# Patient Record
(demographics unavailable — no encounter records)

---
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
## 2024-10-11 NOTE — PHYSICAL EXAM
[Well Developed] : well developed [Well Nourished] : well nourished [No Acute Distress] : no acute distress [Normal Conjunctiva] : normal conjunctiva [Normal Venous Pressure] : normal venous pressure [Normal S1, S2] : normal S1, S2 [Murmur] : murmur [Clear Lung Fields] : clear lung fields [Good Air Entry] : good air entry [No Respiratory Distress] : no respiratory distress  [Wheeze ____] : wheeze [unfilled] [Soft] : abdomen soft [Non Tender] : non-tender [Abnormal Gait] : abnormal gait [No Edema] : no edema [No Cyanosis] : no cyanosis [No Rash] : no rash [No Skin Lesions] : no skin lesions [Moves all extremities] : moves all extremities [No Focal Deficits] : no focal deficits [Normal Speech] : normal speech [Alert and Oriented] : alert and oriented [Normal memory] : normal memory [de-identified] : uses cane, pt has hx of arhritis and chronic neck and back pain due to past injury

## 2024-10-11 NOTE — ASSESSMENT
[FreeTextEntry1] : 60-year-old female PMHx: HTN, HLD. cardiomyopathy, nonrheumatic mitral valve regurgitation, obesity, IRIS uses CPAP, prediabetes, asthma, arthritis  Pulmonary: MD Antwon Gunter   #HTN- uncontrolled #HLD- at goal  # Moderate Mitral Regurgitation in 2018  #Obesity  Patient is following up after 5 years. No chest pain, pt has SOB follows with pulmonary. Pt has hx of mild-moderate MR. Pt has past CAC of 0 in 2018. LDL is at goal at this time. BP slightly elevated in office, will reassess. Pt uses CPAP nightly. The patient may need surgery on her cervical and lumbar spine .    Plan: 2D Echo for MR  CT Calcium Score Repeat fasting bloodwork Follow up in 3-4 months

## 2024-10-11 NOTE — CARDIOLOGY SUMMARY
[de-identified] : 10-: SR 84bpm, low QRS voltage, NS T-wave abnormality [de-identified] : 6-: NST No evidence of ischemia  [de-identified] : 11-8-2018: LVEF 64%, mild-moderate MR, mild TR  [de-identified] : CT 8-2-2018: CAC 0

## 2024-10-11 NOTE — REVIEW OF SYSTEMS
[SOB] : shortness of breath [Dyspnea on exertion] : dyspnea during exertion [Lower Ext Edema] : lower extremity edema [Leg Claudication] : intermittent leg claudication [Wheezing] : wheezing [Joint Pain] : joint pain [Joint Stiffness] : joint stiffness [Negative] : Heme/Lymph [Chest Discomfort] : no chest discomfort [Palpitations] : no palpitations [Syncope] : no syncope

## 2025-01-24 NOTE — HISTORY OF PRESENT ILLNESS
[TextBox_4] : IRIS COMPLIANT AND BENEFITING MACHINE OLD SOB ON EXERTION NON  SMOKER, GAINED WEIGHT ASTHMA AS NEEDED INHALERS INCREASE USAGE

## 2025-01-24 NOTE — DISCUSSION/SUMMARY
[FreeTextEntry1] : IRIS COMPLAINT AND BENEFITING WILL ORDER NEW MACHINE ( OLD) CARDIO NOTE REVIEWED WEIGHT LOSS ASTHMA AS NEEDED INHALERS POORLY CONTROLLED START MAINTEANCE

## 2025-02-25 NOTE — PHYSICAL EXAM
[Well Developed] : well developed [Well Nourished] : well nourished [No Acute Distress] : no acute distress [Normal Conjunctiva] : normal conjunctiva [Normal Venous Pressure] : normal venous pressure [Normal S1, S2] : normal S1, S2 [Murmur] : murmur [Clear Lung Fields] : clear lung fields [Good Air Entry] : good air entry [No Respiratory Distress] : no respiratory distress  [Wheeze ____] : wheeze [unfilled] [Soft] : abdomen soft [Non Tender] : non-tender [Abnormal Gait] : abnormal gait [No Edema] : no edema [No Cyanosis] : no cyanosis [No Rash] : no rash [No Skin Lesions] : no skin lesions [Moves all extremities] : moves all extremities [No Focal Deficits] : no focal deficits [Normal Speech] : normal speech [Alert and Oriented] : alert and oriented [Normal memory] : normal memory [de-identified] : uses cane, pt has hx of arhritis and chronic neck and back pain due to past injury

## 2025-02-25 NOTE — CARDIOLOGY SUMMARY
[de-identified] : 10-: SR 84bpm, low QRS voltage, NS T-wave abnormality 2- NSR Normal ECG  [de-identified] : 6-: NST No evidence of ischemia  [de-identified] : 11-8-2018: LVEF 64%, mild-moderate MR, mild TR  11-5-2025 EF 53% Trace MR trace TR RVSP was 25 mmhg  [de-identified] : CT 8-2-2018: CAC 0
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
## 2025-02-25 NOTE — ASSESSMENT
[FreeTextEntry1] : Patient is following up after 5 years. No chest pain, pt has SOB follows with pulmonary. Pt has hx of mild-moderate MR. Pt has past CAC of 0 in 2018. LDL is at goal at this time. BP slightly elevated in office, will reassess. Pt uses CPAP nightly. The patient may need surgery on her cervical and lumbar spine .   Echo showed low normal LV systolic function . MR was trace however .   Plan:  Lexiscan stress test , SOB and will need this prior to back surgery .  Blood work  follow up in 4-5 months if nuclear

## 2025-07-18 NOTE — DISCUSSION/SUMMARY
[FreeTextEntry1] : IRIS COMPLAINT AND BENEFITING  CARDIO NOTE REVIEWED WEIGHT LOSS ASTHMA ON SYMBICORT WEILL RENEW

## 2025-07-29 NOTE — CARDIOLOGY SUMMARY
[de-identified] : 10-: SR 84bpm, low QRS voltage, NS T-wave abnormality 2- NSR Normal ECG  7- NSR NS T wave change  [de-identified] : 6-: NST No evidence of ischemia  [de-identified] : 11-8-2018: LVEF 64%, mild-moderate MR, mild TR  11-5-2024 EF 53% Trace MR trace TR RVSP was 25 mmhg  [de-identified] : CT 8-2-2018: CAC 0

## 2025-07-29 NOTE — ASSESSMENT
[FreeTextEntry1] : The patient has not had chest pain. She had some SOB which is improved .She is using her CPAP machine. Seeing Dr. Rueda as well and is taking inhalers for her asthma . HEr EF was low normal but was lower than previous echo . Need ischemia work up . She did not have lexiscan stress test as of yet   Plan:  Lexiscan stress test , SOB and will need this prior to back surgery . 2nd request  Blood work  follow up in 4-5 months if nuclear is ok

## 2025-07-29 NOTE — PHYSICAL EXAM
[Well Developed] : well developed [Well Nourished] : well nourished [No Acute Distress] : no acute distress [Normal Conjunctiva] : normal conjunctiva [Normal Venous Pressure] : normal venous pressure [Normal S1, S2] : normal S1, S2 [Murmur] : murmur [Clear Lung Fields] : clear lung fields [Good Air Entry] : good air entry [No Respiratory Distress] : no respiratory distress  [Wheeze ____] : wheeze [unfilled] [Soft] : abdomen soft [Non Tender] : non-tender [Abnormal Gait] : abnormal gait [No Edema] : no edema [No Cyanosis] : no cyanosis [No Rash] : no rash [No Skin Lesions] : no skin lesions [Moves all extremities] : moves all extremities [No Focal Deficits] : no focal deficits [Normal Speech] : normal speech [Alert and Oriented] : alert and oriented [Normal memory] : normal memory [de-identified] : uses cane, pt has hx of arhritis and chronic neck and back pain due to past injury

## 2025-07-29 NOTE — REVIEW OF SYSTEMS
[SOB] : shortness of breath [Dyspnea on exertion] : dyspnea during exertion [Chest Discomfort] : no chest discomfort [Lower Ext Edema] : lower extremity edema [Leg Claudication] : intermittent leg claudication [Palpitations] : no palpitations [Syncope] : no syncope [Wheezing] : wheezing [Joint Pain] : joint pain [Joint Stiffness] : joint stiffness [Negative] : Heme/Lymph